# Patient Record
Sex: FEMALE | Race: ASIAN | NOT HISPANIC OR LATINO | Employment: UNEMPLOYED | URBAN - METROPOLITAN AREA
[De-identification: names, ages, dates, MRNs, and addresses within clinical notes are randomized per-mention and may not be internally consistent; named-entity substitution may affect disease eponyms.]

---

## 2022-08-19 ENCOUNTER — OFFICE VISIT (OUTPATIENT)
Dept: URGENT CARE | Facility: CLINIC | Age: 37
End: 2022-08-19

## 2022-08-19 VITALS
HEIGHT: 63 IN | SYSTOLIC BLOOD PRESSURE: 121 MMHG | OXYGEN SATURATION: 99 % | WEIGHT: 174 LBS | BODY MASS INDEX: 30.83 KG/M2 | RESPIRATION RATE: 16 BRPM | TEMPERATURE: 98.6 F | DIASTOLIC BLOOD PRESSURE: 78 MMHG | HEART RATE: 79 BPM

## 2022-08-19 DIAGNOSIS — H10.32 ACUTE BACTERIAL CONJUNCTIVITIS OF LEFT EYE: Primary | ICD-10-CM

## 2022-08-19 PROCEDURE — 99213 OFFICE O/P EST LOW 20 MIN: CPT

## 2022-08-19 RX ORDER — LEVOTHYROXINE SODIUM 0.03 MG/1
25 TABLET ORAL DAILY
COMMUNITY

## 2022-08-19 RX ORDER — POLYMYXIN B SULFATE AND TRIMETHOPRIM 1; 10000 MG/ML; [USP'U]/ML
1 SOLUTION OPHTHALMIC EVERY 4 HOURS
Qty: 10 ML | Refills: 0 | Status: SHIPPED | OUTPATIENT
Start: 2022-08-19 | End: 2022-08-26

## 2022-08-19 RX ORDER — FEXOFENADINE HCL 180 MG/1
180 TABLET ORAL DAILY
COMMUNITY

## 2022-08-19 NOTE — PROGRESS NOTES
[address] [employer] [facility]        NAME: Colette Salazar is a 39 y o  female  : 1985    MRN: [id number]  DATE: 2022  TIME: 6:08 PM    Assessment and Plan   Acute bacterial conjunctivitis of left eye [H10 32]  1  Acute bacterial conjunctivitis of left eye  polymyxin b-trimethoprim (POLYTRIM) ophthalmic solution         Patient Instructions     Start ophthalmic drops as directed  Follow up with PCP in 2-3 days  Proceed to ER if symptoms worsen  Chief Complaint     Chief Complaint   Patient presents with    Conjunctivitis     Left eye irritation and redness started this morning  History of Present Illness     39 y o  F presents with complaint of L eye redness, drainage and crusting x 1 day  States she woke up with her eye crusted shut  Denies pain or itching  Denies trauma or injury  No contact lenses  3year old daughter at home  Denies URI symptoms  No fevers  Denies visual disturbance  Review of Systems   Review of Systems   Constitutional: Negative for chills, fatigue and fever  HENT: Negative for congestion, ear pain, facial swelling, hearing loss, rhinorrhea, sinus pressure, sneezing, sore throat and trouble swallowing  Eyes: Positive for discharge and redness  Negative for photophobia, pain, itching and visual disturbance  Respiratory: Negative for cough, chest tightness, shortness of breath and wheezing  Cardiovascular: Negative for chest pain and palpitations  Gastrointestinal: Negative for abdominal pain, diarrhea, nausea and vomiting  Genitourinary: Negative for dysuria, flank pain, hematuria and pelvic pain  Musculoskeletal: Negative for arthralgias, back pain and myalgias  Skin: Negative for color change and rash  Neurological: Negative for dizziness, seizures, syncope, weakness, light-headedness, numbness and headaches  Psychiatric/Behavioral: Negative for confusion, hallucinations and sleep disturbance  The patient is not nervous/anxious  All other systems reviewed and are negative  Current Medications       Current Outpatient Medications:     polymyxin b-trimethoprim (POLYTRIM) ophthalmic solution, Administer 1 drop into the left eye every 4 (four) hours for 7 days, Disp: 10 mL, Rfl: 0    fexofenadine (ALLEGRA) 180 MG tablet, Take 180 mg by mouth daily, Disp: , Rfl:     levothyroxine 25 mcg tablet, Take 25 mcg by mouth daily, Disp: , Rfl:     Current Allergies     Allergies as of 08/19/2022 - Reviewed 08/19/2022   Allergen Reaction Noted    Pollen extract Itching 07/17/2021            The following portions of the patient's history were reviewed and updated as appropriate: allergies, current medications, past family history, past medical history, past social history, past surgical history and problem list      Past Medical History:   Diagnosis Date    Disease of thyroid gland        History reviewed  No pertinent surgical history  History reviewed  No pertinent family history  Medications have been verified  Objective   /78   Pulse 79   Temp 98 6 °F (37 °C)   Resp 16   Ht 5' 3" (1 6 m)   Wt 78 9 kg (174 lb)   SpO2 99%   BMI 30 82 kg/m²   No LMP recorded  Physical Exam     Physical Exam  Vitals reviewed  Constitutional:       General: She is not in acute distress  Appearance: Normal appearance  She is not toxic-appearing  HENT:      Head: Normocephalic  Right Ear: Tympanic membrane normal  Tympanic membrane is not erythematous or bulging  Left Ear: Tympanic membrane normal  Tympanic membrane is not erythematous or bulging  Nose: No congestion or rhinorrhea  Right Sinus: No maxillary sinus tenderness or frontal sinus tenderness  Left Sinus: No maxillary sinus tenderness or frontal sinus tenderness  Mouth/Throat:      Pharynx: Uvula midline  No oropharyngeal exudate, posterior oropharyngeal erythema or uvula swelling        Tonsils: No tonsillar exudate or tonsillar abscesses  Eyes:      General: No visual field deficit  Left eye: Discharge present  Extraocular Movements: Extraocular movements intact  Conjunctiva/sclera:      Left eye: Left conjunctiva is injected  Pupils: Pupils are equal, round, and reactive to light  Cardiovascular:      Rate and Rhythm: Normal rate and regular rhythm  Pulses: Normal pulses  Heart sounds: Normal heart sounds  Pulmonary:      Effort: Pulmonary effort is normal  No tachypnea or respiratory distress  Breath sounds: Normal breath sounds and air entry  No decreased breath sounds, wheezing, rhonchi or rales  Abdominal:      General: Bowel sounds are normal       Palpations: Abdomen is soft  Tenderness: There is no abdominal tenderness  There is no right CVA tenderness or guarding  Musculoskeletal:         General: Normal range of motion  Cervical back: Normal range of motion  Lymphadenopathy:      Cervical: No cervical adenopathy  Skin:     General: Skin is warm and dry  Neurological:      General: No focal deficit present  Mental Status: She is alert  Cranial Nerves: Cranial nerves are intact  Sensory: Sensation is intact  Motor: Motor function is intact  Coordination: Coordination is intact  Gait: Gait is intact  Deep Tendon Reflexes: Reflexes are normal and symmetric

## 2023-06-01 ENCOUNTER — OFFICE VISIT (OUTPATIENT)
Dept: URGENT CARE | Facility: CLINIC | Age: 38
End: 2023-06-01

## 2023-06-01 VITALS
BODY MASS INDEX: 29.05 KG/M2 | TEMPERATURE: 97.3 F | WEIGHT: 164 LBS | HEART RATE: 100 BPM | RESPIRATION RATE: 14 BRPM | OXYGEN SATURATION: 97 %

## 2023-06-01 DIAGNOSIS — J02.9 SORE THROAT: Primary | ICD-10-CM

## 2023-06-01 LAB — S PYO AG THROAT QL: NEGATIVE

## 2023-06-01 PROCEDURE — 87070 CULTURE OTHR SPECIMN AEROBIC: CPT | Performed by: PHYSICIAN ASSISTANT

## 2023-06-01 NOTE — PROGRESS NOTES
[facility]        NAME: Scotty Galloway is a 44 y o  female  : 1985    MRN: [id number]  DATE: 2023  TIME: 11:00 AM    Assessment and Plan   Sore throat [J02 9]  1  Sore throat  POCT rapid strepA    Throat culture            Patient Instructions   Patient Instructions   - strep           Follow up with PCP in 3-5 days  Proceed to  ER if symptoms worsen  Chief Complaint     Chief Complaint   Patient presents with   • Cold Like Symptoms     Pt presents with chest congestion, cough, stuffy nose, started last Friday         History of Present Illness       The patient is a 50-year-old female presenting today with her   She is concerned she may have strep throat as her  tested positive in the office for strep  She does report a cough, congestion and sore throat that began 1 week ago  She is having improvement in symptoms but would like a strep test just in case  Review of Systems   Review of Systems   Constitutional: Negative for activity change, appetite change, chills, fatigue and fever  HENT: Positive for congestion and sore throat  Negative for ear pain, rhinorrhea, sinus pressure and sinus pain  Eyes: Negative for pain and visual disturbance  Respiratory: Positive for cough  Negative for chest tightness and shortness of breath  Cardiovascular: Negative for chest pain and palpitations  Gastrointestinal: Negative for abdominal pain, diarrhea, nausea and vomiting  Genitourinary: Negative for dysuria and hematuria  Musculoskeletal: Negative for arthralgias, back pain and myalgias  Skin: Negative for color change, pallor and rash  Neurological: Negative for seizures, syncope and headaches  All other systems reviewed and are negative          Current Medications       Current Outpatient Medications:   •  fexofenadine (ALLEGRA) 180 MG tablet, Take 180 mg by mouth daily, Disp: , Rfl:   •  levothyroxine 25 mcg tablet, Take 25 mcg by mouth daily (Patient not taking: Reported on 6/1/2023), Disp: , Rfl:     Current Allergies     Allergies as of 06/01/2023 - Reviewed 06/01/2023   Allergen Reaction Noted   • Pollen extract Itching 07/17/2021            The following portions of the patient's history were reviewed and updated as appropriate: allergies, current medications, past family history, past medical history, past social history, past surgical history and problem list      Past Medical History:   Diagnosis Date   • Disease of thyroid gland        History reviewed  No pertinent surgical history  History reviewed  No pertinent family history  Medications have been verified  Objective   Pulse 100   Temp (!) 97 3 °F (36 3 °C)   Resp 14   Wt 74 4 kg (164 lb)   LMP 05/02/2023 (Approximate)   SpO2 97%   BMI 29 05 kg/m²        Physical Exam     Physical Exam  Vitals and nursing note reviewed  Constitutional:       General: She is not in acute distress  Appearance: Normal appearance  She is normal weight  She is not ill-appearing, toxic-appearing or diaphoretic  HENT:      Head: Normocephalic and atraumatic  Mouth/Throat:      Mouth: Mucous membranes are moist       Pharynx: Oropharynx is clear  Posterior oropharyngeal erythema (very slight ) present  No oropharyngeal exudate  Cardiovascular:      Rate and Rhythm: Normal rate and regular rhythm  Heart sounds: Normal heart sounds  No murmur heard  No friction rub  No gallop  Pulmonary:      Effort: Pulmonary effort is normal  No respiratory distress  Breath sounds: Normal breath sounds  No stridor  No wheezing, rhonchi or rales  Chest:      Chest wall: No tenderness  Skin:     General: Skin is warm and dry  Capillary Refill: Capillary refill takes less than 2 seconds  Neurological:      Mental Status: She is alert

## 2023-06-03 LAB — BACTERIA THROAT CULT: NORMAL

## 2023-10-09 ENCOUNTER — OFFICE VISIT (OUTPATIENT)
Dept: URGENT CARE | Facility: CLINIC | Age: 38
End: 2023-10-09
Payer: COMMERCIAL

## 2023-10-09 VITALS
SYSTOLIC BLOOD PRESSURE: 137 MMHG | WEIGHT: 165 LBS | RESPIRATION RATE: 15 BRPM | DIASTOLIC BLOOD PRESSURE: 73 MMHG | TEMPERATURE: 97.5 F | BODY MASS INDEX: 29.23 KG/M2 | HEART RATE: 93 BPM | OXYGEN SATURATION: 97 %

## 2023-10-09 DIAGNOSIS — H66.001 NON-RECURRENT ACUTE SUPPURATIVE OTITIS MEDIA OF RIGHT EAR WITHOUT SPONTANEOUS RUPTURE OF TYMPANIC MEMBRANE: Primary | ICD-10-CM

## 2023-10-09 PROCEDURE — G0382 LEV 3 HOSP TYPE B ED VISIT: HCPCS | Performed by: NURSE PRACTITIONER

## 2023-10-09 RX ORDER — AZITHROMYCIN 250 MG/1
TABLET, FILM COATED ORAL
Qty: 6 TABLET | Refills: 0 | Status: SHIPPED | OUTPATIENT
Start: 2023-10-09 | End: 2023-10-13

## 2023-10-09 NOTE — PROGRESS NOTES
[facility]        NAME: Michael Rai is a 44 y.o. female  : 1985    MRN: [id number]  DATE: 2023  TIME: 2:36 PM    Assessment and Plan   Non-recurrent acute suppurative otitis media of right ear without spontaneous rupture of tympanic membrane [H66.001]  1. Non-recurrent acute suppurative otitis media of right ear without spontaneous rupture of tympanic membrane  azithromycin (ZITHROMAX) 250 mg tablet            Patient Instructions       Follow up with PCP in 3-5 days. Proceed to  ER if symptoms worsen. Chief Complaint     Chief Complaint   Patient presents with   • Cold Like Symptoms     Pt reports low grade fever/ear pain /throat pain and she lost her sense of smell. History of Present Illness       Patient is a 44year old female presenting with 5 days of right ear pain, sore throat, congestion, and cough. She lost her sense of smell but it returned today. She has been having low grade temps of 100. She is taking ibuprofen with some relief. Review of Systems   Review of Systems   Constitutional: Positive for fever. Negative for activity change and chills. HENT: Positive for congestion, ear pain, postnasal drip, rhinorrhea and sore throat. Negative for ear discharge, sinus pressure and sinus pain. Respiratory: Positive for cough. Negative for shortness of breath. Neurological: Negative for headaches.          Current Medications       Current Outpatient Medications:   •  azithromycin (ZITHROMAX) 250 mg tablet, Take 2 tablets today then 1 tablet daily x 4 days, Disp: 6 tablet, Rfl: 0  •  fexofenadine (ALLEGRA) 180 MG tablet, Take 180 mg by mouth daily, Disp: , Rfl:   •  levothyroxine 25 mcg tablet, Take 25 mcg by mouth daily, Disp: , Rfl:     Current Allergies     Allergies as of 10/09/2023 - Reviewed 10/09/2023   Allergen Reaction Noted   • Pollen extract Itching 2021            The following portions of the patient's history were reviewed and updated as appropriate: allergies, current medications, past family history, past medical history, past social history, past surgical history and problem list.     Past Medical History:   Diagnosis Date   • Disease of thyroid gland        No past surgical history on file. No family history on file. Medications have been verified. Objective   /73   Pulse 93   Temp 97.5 °F (36.4 °C)   Resp 15   Wt 74.8 kg (165 lb)   SpO2 97%   BMI 29.23 kg/m²        Physical Exam     Physical Exam  Vitals reviewed. Constitutional:       General: She is awake. She is not in acute distress. Appearance: Normal appearance. She is normal weight. HENT:      Head: Normocephalic. Right Ear: Ear canal and external ear normal. Tympanic membrane is erythematous and bulging. Left Ear: Hearing, tympanic membrane, ear canal and external ear normal.      Nose: Nose normal.      Mouth/Throat:      Lips: Pink. Pharynx: Oropharynx is clear. Cardiovascular:      Rate and Rhythm: Normal rate and regular rhythm. Heart sounds: Normal heart sounds, S1 normal and S2 normal.   Pulmonary:      Effort: Pulmonary effort is normal.      Breath sounds: Normal breath sounds. No decreased breath sounds, wheezing or rhonchi. Skin:     General: Skin is warm and moist.   Neurological:      Mental Status: She is alert. Psychiatric:         Behavior: Behavior is cooperative.

## 2024-04-02 ENCOUNTER — OFFICE VISIT (OUTPATIENT)
Dept: URGENT CARE | Facility: CLINIC | Age: 39
End: 2024-04-02
Payer: COMMERCIAL

## 2024-04-02 VITALS
HEIGHT: 64 IN | OXYGEN SATURATION: 100 % | BODY MASS INDEX: 29.53 KG/M2 | HEART RATE: 74 BPM | WEIGHT: 173 LBS | TEMPERATURE: 98 F | DIASTOLIC BLOOD PRESSURE: 78 MMHG | SYSTOLIC BLOOD PRESSURE: 122 MMHG | RESPIRATION RATE: 20 BRPM

## 2024-04-02 DIAGNOSIS — H10.31 ACUTE BACTERIAL CONJUNCTIVITIS OF RIGHT EYE: Primary | ICD-10-CM

## 2024-04-02 PROBLEM — Z34.90 TERM PREGNANCY: Status: ACTIVE | Noted: 2021-07-17

## 2024-04-02 PROCEDURE — 99213 OFFICE O/P EST LOW 20 MIN: CPT

## 2024-04-02 RX ORDER — OFLOXACIN 3 MG/ML
2 SOLUTION/ DROPS OPHTHALMIC 4 TIMES DAILY
Qty: 5 ML | Refills: 0 | Status: SHIPPED | OUTPATIENT
Start: 2024-04-02

## 2024-04-02 NOTE — PROGRESS NOTES
[facility]        NAME: Margie Aguiar is a 38 y.o. female  : 1985    MRN: [id number]  DATE: 2024  TIME: 9:56 AM    Assessment and Plan   Acute bacterial conjunctivitis of right eye [H10.31]  1. Acute bacterial conjunctivitis of right eye  ofloxacin (OCUFLOX) 0.3 % ophthalmic solution        Physical exam consistent with conjunctivitis, eye drops as directed. VSS in clinic, appears in no acute distress. Educated on use of OTC products for additional relief of symptoms. Advised close follow-up with PCP or to report to the ER if symptoms worsen. Patient verbalizes understanding and agreeable to plan.       Patient Instructions     Apply drops to affected eye as directed for next 7 days. Apply warm compresses as using drops for additional relief. Wash hands frequently and avoid touching eyes. Change bed linen after 24 hours of using drops. Use oral (zyrtec, claritin, benadryl) or nasal (flonase) decongestants for additional relief of symptoms. Follow-up with PCP in 3-5 days if no improvement of symptoms. Report to ER if symptoms worsen.      Chief Complaint     Chief Complaint   Patient presents with    Conjunctivitis     Rt eye conjunctivitis         History of Present Illness       38 year old female presents for evaluation of right eye redness she woke up this morning with. She relates her  had pink eye last week and she does have small children at home. She also reports a h/o seasonal allergies. She reports eye itchiness, mild light sensitivity, and yellow crusty discharge. She also reports sore throat and congestion that started this morning. She denies visual disturbances, headaches, or lightheadedness. he does not wear glasses or contact lenses. She has not tried any interventions for symptoms.     Conjunctivitis   The current episode started today. The onset was sudden. The problem occurs continuously. The problem has been unchanged. The problem is mild. Nothing  relieves the symptoms. Nothing aggravates the symptoms. Associated symptoms include eye itching, congestion, rhinorrhea, sore throat, eye discharge, eye pain and eye redness. Pertinent negatives include no orthopnea, no fever, no decreased vision, no double vision, no photophobia, no abdominal pain, no constipation, no diarrhea, no nausea, no vomiting, no ear discharge, no ear pain, no headaches, no hearing loss, no mouth sores, no stridor, no swollen glands, no muscle aches, no neck pain, no neck stiffness, no cough, no URI, no wheezing and no rash. The eye pain is mild. The right eye is affected. The eye pain is not associated with movement. The eyelid exhibits redness. She has been Behaving normally. She has been Eating and drinking normally. Urine output has been normal. The last void occurred Less than 6 hours ago. There were no sick contacts. She has received no recent medical care.       Review of Systems   Review of Systems   Constitutional:  Negative for activity change, appetite change, chills, fatigue and fever.   HENT:  Positive for congestion, rhinorrhea and sore throat. Negative for ear discharge, ear pain, hearing loss, mouth sores, postnasal drip, sinus pressure, sinus pain, sneezing and trouble swallowing.    Eyes:  Positive for pain, discharge, redness and itching. Negative for double vision, photophobia and visual disturbance.   Respiratory:  Negative for cough, chest tightness, shortness of breath, wheezing and stridor.    Cardiovascular:  Negative for chest pain, palpitations and orthopnea.   Gastrointestinal:  Negative for abdominal pain, constipation, diarrhea, nausea and vomiting.   Musculoskeletal:  Negative for arthralgias, back pain, myalgias and neck pain.   Skin:  Negative for color change, pallor and rash.   Allergic/Immunologic: Positive for environmental allergies. Negative for food allergies.   Neurological:  Negative for dizziness, light-headedness and headaches.         Current  "Medications       Current Outpatient Medications:     levothyroxine 25 mcg tablet, Take 25 mcg by mouth daily, Disp: , Rfl:     ofloxacin (OCUFLOX) 0.3 % ophthalmic solution, Administer 2 drops to the right eye 4 (four) times a day, Disp: 5 mL, Rfl: 0    fexofenadine (ALLEGRA) 180 MG tablet, Take 180 mg by mouth daily (Patient not taking: Reported on 4/2/2024), Disp: , Rfl:     Current Allergies     Allergies as of 04/02/2024 - Reviewed 04/02/2024   Allergen Reaction Noted    Pollen extract Itching 07/17/2021            The following portions of the patient's history were reviewed and updated as appropriate: allergies, current medications, past family history, past medical history, past social history, past surgical history and problem list.     Past Medical History:   Diagnosis Date    Disease of thyroid gland        History reviewed. No pertinent surgical history.    History reviewed. No pertinent family history.      Medications have been verified.        Objective   /78   Pulse 74   Temp 98 °F (36.7 °C)   Resp 20   Ht 5' 4\" (1.626 m)   Wt 78.5 kg (173 lb)   LMP 03/18/2024 (Approximate)   SpO2 100%   BMI 29.70 kg/m²        Physical Exam     Physical Exam  Vitals and nursing note reviewed.   Constitutional:       General: She is awake. She is not in acute distress.     Appearance: Normal appearance. She is well-developed and normal weight.   HENT:      Head: Normocephalic and atraumatic.      Right Ear: Hearing, tympanic membrane, ear canal and external ear normal.      Left Ear: Hearing, tympanic membrane, ear canal and external ear normal.      Nose: Congestion and rhinorrhea present. Rhinorrhea is clear.      Right Turbinates: Not enlarged, swollen or pale.      Left Turbinates: Not enlarged, swollen or pale.      Right Sinus: No maxillary sinus tenderness or frontal sinus tenderness.      Left Sinus: No maxillary sinus tenderness or frontal sinus tenderness.      Mouth/Throat:      Lips: Pink.    "   Mouth: Mucous membranes are moist.      Pharynx: Oropharynx is clear. Uvula midline. No oropharyngeal exudate or posterior oropharyngeal erythema.      Tonsils: No tonsillar exudate or tonsillar abscesses. 2+ on the right. 2+ on the left.   Eyes:      General:         Right eye: Discharge present. No foreign body or hordeolum.         Left eye: No foreign body, discharge or hordeolum.      Extraocular Movements: Extraocular movements intact.      Conjunctiva/sclera:      Right eye: Right conjunctiva is injected. No chemosis, exudate or hemorrhage.     Left eye: Left conjunctiva is not injected. No chemosis, exudate or hemorrhage.     Pupils: Pupils are equal, round, and reactive to light.      Comments: Dried yellow/crusty discharge present around right eye   Cardiovascular:      Rate and Rhythm: Normal rate and regular rhythm.      Pulses: Normal pulses.      Heart sounds: Normal heart sounds.   Pulmonary:      Effort: Pulmonary effort is normal.      Breath sounds: Normal breath sounds.   Musculoskeletal:      Cervical back: Full passive range of motion without pain, normal range of motion and neck supple.   Lymphadenopathy:      Cervical: No cervical adenopathy.   Skin:     General: Skin is warm and dry.   Neurological:      General: No focal deficit present.      Mental Status: She is alert and oriented to person, place, and time.   Psychiatric:         Mood and Affect: Mood normal.         Behavior: Behavior normal. Behavior is cooperative.         Thought Content: Thought content normal.         Judgment: Judgment normal.

## 2024-04-02 NOTE — PATIENT INSTRUCTIONS
Apply drops to affected eye as directed for next 7 days. Apply warm compresses as using drops for additional relief. Wash hands frequently and avoid touching eyes. Change bed linen after 24 hours of using drops. Use oral (zyrtec, claritin, benadryl) or nasal (flonase) decongestants for additional relief of symptoms. Follow-up with PCP in 3-5 days if no improvement of symptoms. Report to ER if symptoms worsen.

## 2024-09-20 ENCOUNTER — OFFICE VISIT (OUTPATIENT)
Dept: OBGYN CLINIC | Facility: CLINIC | Age: 39
End: 2024-09-20
Payer: COMMERCIAL

## 2024-09-20 VITALS
HEIGHT: 64 IN | WEIGHT: 175.8 LBS | BODY MASS INDEX: 30.01 KG/M2 | SYSTOLIC BLOOD PRESSURE: 110 MMHG | DIASTOLIC BLOOD PRESSURE: 70 MMHG

## 2024-09-20 DIAGNOSIS — Z01.411 ENCOUNTER FOR GYNECOLOGICAL EXAMINATION WITH ABNORMAL FINDING: Primary | ICD-10-CM

## 2024-09-20 DIAGNOSIS — N92.6 IRREGULAR MENSES: ICD-10-CM

## 2024-09-20 DIAGNOSIS — Z12.31 ENCOUNTER FOR SCREENING MAMMOGRAM FOR BREAST CANCER: ICD-10-CM

## 2024-09-20 DIAGNOSIS — N39.3 STRESS INCONTINENCE: ICD-10-CM

## 2024-09-20 PROBLEM — Z34.90 TERM PREGNANCY: Status: RESOLVED | Noted: 2021-07-17 | Resolved: 2024-09-20

## 2024-09-20 PROCEDURE — G0476 HPV COMBO ASSAY CA SCREEN: HCPCS | Performed by: PHYSICIAN ASSISTANT

## 2024-09-20 PROCEDURE — 99385 PREV VISIT NEW AGE 18-39: CPT | Performed by: PHYSICIAN ASSISTANT

## 2024-09-20 PROCEDURE — G0145 SCR C/V CYTO,THINLAYER,RESCR: HCPCS | Performed by: PHYSICIAN ASSISTANT

## 2024-09-20 RX ORDER — CETIRIZINE HYDROCHLORIDE 10 MG/1
10 TABLET ORAL AS NEEDED
COMMUNITY

## 2024-09-20 NOTE — PATIENT INSTRUCTIONS
Mammogram due October 2024.    Call PT for appointment.    Go for pelvic ultrasound.    Call if periods worsen or change.    Follow up with AMARILIS if second pregnancy desired.

## 2024-09-20 NOTE — PROGRESS NOTES
Assessment/Plan:      Diagnoses and all orders for this visit:    Encounter for gynecological examination with abnormal finding  -     Liquid-based pap, screening    Irregular menses  -     US pelvis complete w transvaginal; Future    Stress incontinence  -     Ambulatory Referral to Physical Therapy; Future    Encounter for screening mammogram for breast cancer  -     Mammo screening bilateral w 3d and cad; Future    Other orders  -     cetirizine (ZyrTEC) 10 mg tablet; Take 10 mg by mouth if needed for allergies        Pap done.  Order for mammogram entered.  Referral to pelvic floor PT entered; patient to call for appointment.  Order for pelvic U/S entered secondary to irregular menses; we will call with results.  Call if periods worsen or change.  F/u with AMARILIS if she chooses to try for a second pregnancy.  If no problems, patient to return in 1 year for routine gyn care.    Subjective:     Patient ID: Margie Aguiar is a 38 y.o. female.    Patient is here for yearly gyn exam.  She is new to our office today.  It has been 3 years since her last gyn visit.  States she is doing well overall.  Patient is a  via IVF pregnancy with vaginal delivery 3 years ago.  Currently has 1 embryo remaining.  Still considering if she wishes to try for further pregnancy.  Periods are irregular every 45 days or so, and bleeding lasts for 4-5 days.  She denies heavy bleeding and severe cramping.  Will occasionally skip a cycle.  Never officially diagnosed with PCOS, but her sister has it.  Patient is sexually active with her .  Complains of worsening stress incontinence, as well as painless vaginal lump.  Denies bowel/bladder changes, pelvic pain, bloating, abdominal pain, n/v, and change in appetite.  Had thyroid dysfunction during her pregnancy but has not followed up with PCP.  Has appointment scheduled for 10/7. No history of abnormal Paps.    Will be due for first mammogram in October.  She is performing  "self-breast exam.  Denies new masses, skin changes, nipple discharge, and pain/tenderness.  Unclear family history of cancer in a maternal aunt; otherwise negative.        Review of Systems   Constitutional:  Negative for appetite change and unexpected weight change.   Cardiovascular:         No masses, skin changes, nipple discharge, and pain/tenderness.   Gastrointestinal:  Negative for abdominal distention, abdominal pain, constipation, diarrhea, nausea and vomiting.   Genitourinary:  Positive for menstrual problem (Irregular menses). Negative for difficulty urinating, dysuria, frequency, genital sores, hematuria, pelvic pain, urgency, vaginal bleeding, vaginal discharge and vaginal pain.         Objective:  Visit Vitals  /70 (BP Location: Left arm, Patient Position: Sitting, Cuff Size: Adult)   Ht 5' 4\" (1.626 m)   Wt 79.7 kg (175 lb 12.8 oz)   LMP 09/15/2024 (Exact Date)   BMI 30.18 kg/m²   OB Status Unknown   Smoking Status Never   BSA 1.85 m²         Physical Exam  Vitals reviewed. Exam conducted with a chaperone present.   Constitutional:       Appearance: Normal appearance. She is well-developed.   Neck:      Thyroid: No thyromegaly.   Pulmonary:      Effort: Pulmonary effort is normal.   Chest:   Breasts:     Breasts are symmetrical.      Right: Normal. No swelling, bleeding, inverted nipple, mass, nipple discharge, skin change or tenderness.      Left: Normal. No swelling, bleeding, inverted nipple, mass, nipple discharge, skin change or tenderness.   Abdominal:      General: There is no distension.      Palpations: Abdomen is soft.      Tenderness: There is no abdominal tenderness.   Genitourinary:     General: Normal vulva.      Pubic Area: No rash.       Labia:         Right: No rash, tenderness, lesion or injury.         Left: No rash, tenderness, lesion or injury.       Vagina: Prolapsed vaginal walls present. No vaginal discharge, erythema, tenderness or bleeding.      Cervix: Normal.      " Uterus: Normal.       Adnexa: Right adnexa normal and left adnexa normal.        Right: No mass, tenderness or fullness.          Left: No mass, tenderness or fullness.        Comments: Mild anterior vaginal wall prolapse.  Musculoskeletal:      Cervical back: Neck supple.   Lymphadenopathy:      Cervical: No cervical adenopathy.      Upper Body:      Right upper body: No supraclavicular or axillary adenopathy.      Left upper body: No supraclavicular or axillary adenopathy.      Lower Body: No right inguinal adenopathy. No left inguinal adenopathy.   Skin:     General: Skin is warm and dry.   Neurological:      Mental Status: She is alert and oriented to person, place, and time.   Psychiatric:         Behavior: Behavior normal. Behavior is cooperative.         Thought Content: Thought content normal.         Judgment: Judgment normal.

## 2024-09-24 LAB
HPV HR 12 DNA CVX QL NAA+PROBE: NEGATIVE
HPV16 DNA CVX QL NAA+PROBE: NEGATIVE
HPV18 DNA CVX QL NAA+PROBE: NEGATIVE

## 2024-09-25 LAB
LAB AP GYN PRIMARY INTERPRETATION: NORMAL
Lab: NORMAL

## 2024-10-14 ENCOUNTER — OFFICE VISIT (OUTPATIENT)
Dept: FAMILY MEDICINE CLINIC | Facility: CLINIC | Age: 39
End: 2024-10-14
Payer: COMMERCIAL

## 2024-10-14 VITALS
HEIGHT: 63 IN | WEIGHT: 175 LBS | SYSTOLIC BLOOD PRESSURE: 118 MMHG | BODY MASS INDEX: 31.01 KG/M2 | TEMPERATURE: 97.7 F | HEART RATE: 95 BPM | RESPIRATION RATE: 16 BRPM | DIASTOLIC BLOOD PRESSURE: 76 MMHG | OXYGEN SATURATION: 95 %

## 2024-10-14 DIAGNOSIS — Z13.228 SCREENING FOR METABOLIC DISORDER: ICD-10-CM

## 2024-10-14 DIAGNOSIS — Z76.89 ENCOUNTER TO ESTABLISH CARE: ICD-10-CM

## 2024-10-14 DIAGNOSIS — J30.2 SEASONAL ALLERGIES: ICD-10-CM

## 2024-10-14 DIAGNOSIS — Z11.4 ENCOUNTER FOR SCREENING FOR HIV: ICD-10-CM

## 2024-10-14 DIAGNOSIS — Z13.0 SCREENING FOR IRON DEFICIENCY ANEMIA: ICD-10-CM

## 2024-10-14 DIAGNOSIS — Z86.39 HISTORY OF THYROID DISORDER: Primary | ICD-10-CM

## 2024-10-14 PROCEDURE — 99203 OFFICE O/P NEW LOW 30 MIN: CPT | Performed by: STUDENT IN AN ORGANIZED HEALTH CARE EDUCATION/TRAINING PROGRAM

## 2024-10-14 NOTE — PROGRESS NOTES
Ambulatory Visit  Name: Margie Aguiar      : 1985      MRN: [id number]  Encounter Provider: Jessica Tello MD  Encounter Date: 10/14/2024   Encounter department: [facility]    Assessment & Plan  History of thyroid disorder    Orders:    TSH, 3rd generation with Free T4 reflex; Future    BMI 30.0-30.9,adult    Orders:    Lipid Panel with Direct LDL reflex; Future    Comprehensive metabolic panel; Future    Screening for metabolic disorder    Orders:    Hemoglobin A1C; Future    Screening for iron deficiency anemia    Orders:    CBC and differential; Future    Encounter for screening for HIV    Orders:    HIV 1/2 AG/AB W REFLEX [facility] and [facility] only; Future    Seasonal allergies         Encounter to establish care            History of Present Illness     HPI    Patient presents to establish care. One child via IVF.   She does have irregular periods.   No smoking and drinking history  Father has diabetes  Mother has no health concerns.  Patient woke up with sore throat. Patient has seasonal allergies.   She had thyroid disorder while being pregnant but did not follow up with TSH      Review of Systems   Constitutional:  Negative for activity change, chills, diaphoresis, fatigue and fever.   HENT:  Positive for sore throat. Negative for congestion, postnasal drip and rhinorrhea.    Respiratory:  Negative for cough, shortness of breath and wheezing.    Cardiovascular:  Negative for chest pain, palpitations and leg swelling.   Gastrointestinal:  Negative for abdominal pain, constipation, diarrhea, nausea and vomiting.   Musculoskeletal:  Negative for myalgias.   Skin:  Negative for rash.   Neurological:  Negative for weakness, light-headedness and headaches.   Psychiatric/Behavioral:  The patient is not nervous/anxious.            Objective     /76 (BP Location: Left arm, Patient Position: Sitting, Cuff Size: Standard)   Pulse 95   Temp 97.7 °F (36.5 °C) (Temporal)    "Resp 16   Ht 5' 3.25\" (1.607 m)   Wt 79.4 kg (175 lb)   LMP 09/15/2024 (Exact Date)   SpO2 95%   BMI 30.76 kg/m²     Physical Exam  Constitutional:       Appearance: She is well-developed.   HENT:      Head: Normocephalic and atraumatic.      Right Ear: Tympanic membrane and ear canal normal.      Left Ear: Tympanic membrane and ear canal normal.      Mouth/Throat:      Mouth: Mucous membranes are moist.      Pharynx: Posterior oropharyngeal erythema present.   Cardiovascular:      Rate and Rhythm: Normal rate and regular rhythm.   Pulmonary:      Effort: Pulmonary effort is normal.      Breath sounds: Normal breath sounds.   Neurological:      General: No focal deficit present.      Mental Status: She is alert and oriented to person, place, and time.   Psychiatric:         Mood and Affect: Mood normal.         Behavior: Behavior normal.         "

## 2024-10-17 ENCOUNTER — HOSPITAL ENCOUNTER (OUTPATIENT)
Dept: ULTRASOUND IMAGING | Facility: HOSPITAL | Age: 39
End: 2024-10-17
Payer: COMMERCIAL

## 2024-10-17 DIAGNOSIS — N92.6 IRREGULAR MENSES: ICD-10-CM

## 2024-10-17 PROCEDURE — 76856 US EXAM PELVIC COMPLETE: CPT

## 2024-10-17 PROCEDURE — 76830 TRANSVAGINAL US NON-OB: CPT

## 2024-10-23 ENCOUNTER — TELEPHONE (OUTPATIENT)
Age: 39
End: 2024-10-23

## 2024-10-23 NOTE — TELEPHONE ENCOUNTER
US pelvis significant findings.     IMPRESSION:     1. Mild heterogeneous uterine echotexture may suggest adenomyosis. If more sensitive and specific evaluation for uterine adenomyosis is clinically warranted, contrast-enhanced pelvic MRI may be considered.     2. Bilocular left ovarian cyst measuring 1.9 cm, categorized as O-RADS 2. Recommend repeat follow-up ultrasound in 6 months.

## 2024-10-24 ENCOUNTER — TELEPHONE (OUTPATIENT)
Dept: OBGYN CLINIC | Facility: CLINIC | Age: 39
End: 2024-10-24

## 2024-10-24 DIAGNOSIS — N83.202 LEFT OVARIAN CYST: Primary | ICD-10-CM

## 2024-10-24 NOTE — TELEPHONE ENCOUNTER
Spoke with patient regarding pelvic U/S results.  Possible adenomyosis of uterus seen; endometrium WNL.  L bilocular ovarian cyst seen; f/u in 6 mos for resolution.  Order entered.  Will continue to monitor periods.  Call with problems.

## 2024-11-08 ENCOUNTER — APPOINTMENT (OUTPATIENT)
Dept: LAB | Facility: HOSPITAL | Age: 39
End: 2024-11-08
Payer: COMMERCIAL

## 2024-11-08 DIAGNOSIS — Z13.0 SCREENING FOR IRON DEFICIENCY ANEMIA: ICD-10-CM

## 2024-11-08 DIAGNOSIS — Z11.4 ENCOUNTER FOR SCREENING FOR HIV: ICD-10-CM

## 2024-11-08 DIAGNOSIS — Z13.228 SCREENING FOR METABOLIC DISORDER: ICD-10-CM

## 2024-11-08 DIAGNOSIS — Z86.39 HISTORY OF THYROID DISORDER: ICD-10-CM

## 2024-11-08 LAB
ALBUMIN SERPL BCG-MCNC: 4.5 G/DL (ref 3.5–5)
ALP SERPL-CCNC: 58 U/L (ref 34–104)
ALT SERPL W P-5'-P-CCNC: 14 U/L (ref 7–52)
ANION GAP SERPL CALCULATED.3IONS-SCNC: 7 MMOL/L (ref 4–13)
AST SERPL W P-5'-P-CCNC: 13 U/L (ref 13–39)
BASOPHILS # BLD AUTO: 0.03 THOUSANDS/ÂΜL (ref 0–0.1)
BASOPHILS NFR BLD AUTO: 0 % (ref 0–1)
BILIRUB SERPL-MCNC: 0.42 MG/DL (ref 0.2–1)
BUN SERPL-MCNC: 11 MG/DL (ref 5–25)
CALCIUM SERPL-MCNC: 9.5 MG/DL (ref 8.4–10.2)
CHLORIDE SERPL-SCNC: 102 MMOL/L (ref 96–108)
CHOLEST SERPL-MCNC: 244 MG/DL
CO2 SERPL-SCNC: 30 MMOL/L (ref 21–32)
CREAT SERPL-MCNC: 0.58 MG/DL (ref 0.6–1.3)
EOSINOPHIL # BLD AUTO: 0.23 THOUSAND/ÂΜL (ref 0–0.61)
EOSINOPHIL NFR BLD AUTO: 3 % (ref 0–6)
ERYTHROCYTE [DISTWIDTH] IN BLOOD BY AUTOMATED COUNT: 12.5 % (ref 11.6–15.1)
EST. AVERAGE GLUCOSE BLD GHB EST-MCNC: 117 MG/DL
GFR SERPL CREATININE-BSD FRML MDRD: 115 ML/MIN/1.73SQ M
GLUCOSE P FAST SERPL-MCNC: 94 MG/DL (ref 65–99)
HBA1C MFR BLD: 5.7 %
HCT VFR BLD AUTO: 40.6 % (ref 34.8–46.1)
HDLC SERPL-MCNC: 38 MG/DL
HGB BLD-MCNC: 12.8 G/DL (ref 11.5–15.4)
IMM GRANULOCYTES # BLD AUTO: 0.02 THOUSAND/UL (ref 0–0.2)
IMM GRANULOCYTES NFR BLD AUTO: 0 % (ref 0–2)
LDLC SERPL CALC-MCNC: 152 MG/DL (ref 0–100)
LYMPHOCYTES # BLD AUTO: 2.64 THOUSANDS/ÂΜL (ref 0.6–4.47)
LYMPHOCYTES NFR BLD AUTO: 30 % (ref 14–44)
MCH RBC QN AUTO: 28.1 PG (ref 26.8–34.3)
MCHC RBC AUTO-ENTMCNC: 31.5 G/DL (ref 31.4–37.4)
MCV RBC AUTO: 89 FL (ref 82–98)
MONOCYTES # BLD AUTO: 0.51 THOUSAND/ÂΜL (ref 0.17–1.22)
MONOCYTES NFR BLD AUTO: 6 % (ref 4–12)
NEUTROPHILS # BLD AUTO: 5.34 THOUSANDS/ÂΜL (ref 1.85–7.62)
NEUTS SEG NFR BLD AUTO: 61 % (ref 43–75)
NRBC BLD AUTO-RTO: 0 /100 WBCS
PLATELET # BLD AUTO: 281 THOUSANDS/UL (ref 149–390)
PMV BLD AUTO: 9.2 FL (ref 8.9–12.7)
POTASSIUM SERPL-SCNC: 4.6 MMOL/L (ref 3.5–5.3)
PROT SERPL-MCNC: 7.9 G/DL (ref 6.4–8.4)
RBC # BLD AUTO: 4.55 MILLION/UL (ref 3.81–5.12)
SODIUM SERPL-SCNC: 139 MMOL/L (ref 135–147)
TRIGL SERPL-MCNC: 270 MG/DL
TSH SERPL DL<=0.05 MIU/L-ACNC: 2.57 UIU/ML (ref 0.45–4.5)
WBC # BLD AUTO: 8.77 THOUSAND/UL (ref 4.31–10.16)

## 2024-11-08 PROCEDURE — 80053 COMPREHEN METABOLIC PANEL: CPT

## 2024-11-08 PROCEDURE — 83036 HEMOGLOBIN GLYCOSYLATED A1C: CPT

## 2024-11-08 PROCEDURE — 36415 COLL VENOUS BLD VENIPUNCTURE: CPT

## 2024-11-08 PROCEDURE — 85025 COMPLETE CBC W/AUTO DIFF WBC: CPT

## 2024-11-08 PROCEDURE — 87389 HIV-1 AG W/HIV-1&-2 AB AG IA: CPT

## 2024-11-08 PROCEDURE — 80061 LIPID PANEL: CPT

## 2024-11-08 PROCEDURE — 84443 ASSAY THYROID STIM HORMONE: CPT

## 2024-11-09 LAB — HIV 1+2 AB+HIV1 P24 AG SERPL QL IA: NON REACTIVE

## 2024-11-11 ENCOUNTER — OFFICE VISIT (OUTPATIENT)
Dept: FAMILY MEDICINE CLINIC | Facility: CLINIC | Age: 39
End: 2024-11-11
Payer: COMMERCIAL

## 2024-11-11 VITALS
OXYGEN SATURATION: 98 % | WEIGHT: 171 LBS | HEART RATE: 94 BPM | SYSTOLIC BLOOD PRESSURE: 100 MMHG | TEMPERATURE: 97.1 F | RESPIRATION RATE: 16 BRPM | DIASTOLIC BLOOD PRESSURE: 80 MMHG | BODY MASS INDEX: 30.3 KG/M2 | HEIGHT: 63 IN

## 2024-11-11 DIAGNOSIS — E78.2 MIXED HYPERLIPIDEMIA: ICD-10-CM

## 2024-11-11 DIAGNOSIS — Z00.00 ANNUAL PHYSICAL EXAM: Primary | ICD-10-CM

## 2024-11-11 DIAGNOSIS — Z23 ENCOUNTER FOR IMMUNIZATION: ICD-10-CM

## 2024-11-11 DIAGNOSIS — R73.03 PREDIABETES: ICD-10-CM

## 2024-11-11 DIAGNOSIS — H91.91 HEARING LOSS OF RIGHT EAR, UNSPECIFIED HEARING LOSS TYPE: ICD-10-CM

## 2024-11-11 PROBLEM — N83.209 OVARIAN CYST: Status: ACTIVE | Noted: 2024-11-11

## 2024-11-11 PROBLEM — N92.6 IRREGULAR PERIODS: Status: ACTIVE | Noted: 2024-11-11

## 2024-11-11 PROBLEM — N80.03 ADENOMYOSIS: Status: ACTIVE | Noted: 2024-11-11

## 2024-11-11 PROCEDURE — 99396 PREV VISIT EST AGE 40-64: CPT | Performed by: STUDENT IN AN ORGANIZED HEALTH CARE EDUCATION/TRAINING PROGRAM

## 2024-11-11 PROCEDURE — 99214 OFFICE O/P EST MOD 30 MIN: CPT | Performed by: STUDENT IN AN ORGANIZED HEALTH CARE EDUCATION/TRAINING PROGRAM

## 2024-11-11 PROCEDURE — 90471 IMMUNIZATION ADMIN: CPT | Performed by: STUDENT IN AN ORGANIZED HEALTH CARE EDUCATION/TRAINING PROGRAM

## 2024-11-11 PROCEDURE — 90656 IIV3 VACC NO PRSV 0.5 ML IM: CPT | Performed by: STUDENT IN AN ORGANIZED HEALTH CARE EDUCATION/TRAINING PROGRAM

## 2024-11-11 RX ORDER — ROSUVASTATIN CALCIUM 10 MG/1
10 TABLET, COATED ORAL DAILY
Qty: 90 TABLET | Refills: 1 | Status: SHIPPED | OUTPATIENT
Start: 2024-11-11

## 2024-11-11 NOTE — PROGRESS NOTES
Adult Annual Physical  Name: Margie Aguiar      : 1985      MRN: [id number]  Encounter Provider: Jessica Tello MD  Encounter Date: 2024   Encounter department: [facility]    Assessment & Plan  Annual physical exam         Encounter for immunization    Orders:    influenza vaccine, recombinant, PF, 0.5 mL IM (Flublok)    Mixed hyperlipidemia    Orders:    rosuvastatin (CRESTOR) 10 MG tablet; Take 1 tablet (10 mg total) by mouth daily    Lipid Panel with Direct LDL reflex; Future    Hearing loss of right ear, unspecified hearing loss type    Orders:    Ambulatory Referral to Audiology; Future    Prediabetes  -A1c 5.7  -Diet and lifestyle modifications discussed  Orders:    Hemoglobin A1C; Future    Immunizations and preventive care screenings were discussed with patient today. Appropriate education was printed on patient's after visit summary.    Counseling:  Dental Health: discussed importance of regular tooth brushing, flossing, and dental visits.  Exercise: the importance of regular exercise/physical activity was discussed. Recommend exercise 3-5 times per week for at least 30 minutes.       Depression Screening and Follow-up Plan: Patient was screened for depression during today's encounter. They screened negative with a PHQ-2 score of 0.        History of Present Illness     Adult Annual Physical:  Patient presents for annual physical. Patient reports hearing issues in right ear. This has been an ongoing issue. She has not had an audiology exam. .     Diet and Physical Activity:  - Diet/Nutrition: well balanced diet, consuming 3-5 servings of fruits/vegetables daily and adequate fiber intake.  - Exercise: no formal exercise.    Depression Screening:  - PHQ-2 Score: 0    General Health:  - Sleep: sleeps well and 7-8 hours of sleep on average.  - Hearing: normal hearing right ear and decreased hearing right ear.  - Vision: no vision problems and most recent eye  "exam > 1 year ago.  - Dental: regular dental visits and brushes teeth once daily.    /GYN Health:  - Follows with GYN: yes.   - History of STDs: no    Review of Systems   Constitutional:  Negative for activity change, chills, diaphoresis, fatigue and fever.   HENT:  Negative for congestion, postnasal drip, rhinorrhea and sore throat.    Respiratory:  Negative for cough, shortness of breath and wheezing.    Cardiovascular:  Negative for chest pain, palpitations and leg swelling.   Gastrointestinal:  Negative for abdominal pain, constipation, diarrhea, nausea and vomiting.   Musculoskeletal:  Negative for myalgias.   Skin:  Negative for rash.   Neurological:  Negative for weakness, light-headedness and headaches.   Psychiatric/Behavioral:  The patient is not nervous/anxious.          Objective     /80 (BP Location: Left arm, Patient Position: Sitting, Cuff Size: Large)   Pulse 94   Temp (!) 97.1 °F (36.2 °C) (Temporal)   Resp 16   Ht 5' 2.5\" (1.588 m)   Wt 77.6 kg (171 lb)   LMP 09/16/2024 (Approximate)   SpO2 98%   BMI 30.78 kg/m²     Physical Exam  Vitals and nursing note reviewed.   Constitutional:       General: She is not in acute distress.     Appearance: She is well-developed.   HENT:      Head: Normocephalic and atraumatic.   Eyes:      Conjunctiva/sclera: Conjunctivae normal.   Cardiovascular:      Rate and Rhythm: Normal rate and regular rhythm.      Heart sounds: No murmur heard.  Pulmonary:      Effort: Pulmonary effort is normal. No respiratory distress.      Breath sounds: Normal breath sounds.   Abdominal:      Palpations: Abdomen is soft.      Tenderness: There is no abdominal tenderness.   Musculoskeletal:         General: No swelling.      Cervical back: Neck supple.   Skin:     General: Skin is warm and dry.      Capillary Refill: Capillary refill takes less than 2 seconds.   Neurological:      Mental Status: She is alert.   Psychiatric:         Mood and Affect: Mood normal.         "

## 2024-11-11 NOTE — ASSESSMENT & PLAN NOTE
Orders:    rosuvastatin (CRESTOR) 10 MG tablet; Take 1 tablet (10 mg total) by mouth daily    Lipid Panel with Direct LDL reflex; Future

## 2024-12-12 ENCOUNTER — HOSPITAL ENCOUNTER (OUTPATIENT)
Facility: HOSPITAL | Age: 39
End: 2024-12-12
Payer: COMMERCIAL

## 2024-12-12 VITALS — HEIGHT: 63 IN | BODY MASS INDEX: 30.3 KG/M2 | WEIGHT: 171 LBS

## 2024-12-12 DIAGNOSIS — Z12.31 ENCOUNTER FOR SCREENING MAMMOGRAM FOR BREAST CANCER: ICD-10-CM

## 2024-12-12 PROCEDURE — 77063 BREAST TOMOSYNTHESIS BI: CPT

## 2024-12-12 PROCEDURE — 77067 SCR MAMMO BI INCL CAD: CPT

## 2024-12-16 ENCOUNTER — RESULTS FOLLOW-UP (OUTPATIENT)
Dept: OBGYN CLINIC | Facility: CLINIC | Age: 39
End: 2024-12-16

## 2024-12-16 NOTE — TELEPHONE ENCOUNTER
Pt called back to return VM per staff provider is in with a pt was informed by staff to send an encounter to provider to call pt back when able too.

## 2024-12-16 NOTE — TELEPHONE ENCOUNTER
Spoke with patient regarding mammogram results - new mass in L breast.  Needs additional imaging; orders in Epic.  Patient to schedule.  Call with further questions.

## 2025-01-21 ENCOUNTER — TELEPHONE (OUTPATIENT)
Age: 40
End: 2025-01-21

## 2025-01-21 NOTE — TELEPHONE ENCOUNTER
Patient states she received a call from the obgyn office.  Reviewed chart no messages found.  Relayed to patient.

## 2025-02-28 ENCOUNTER — OFFICE VISIT (OUTPATIENT)
Dept: FAMILY MEDICINE CLINIC | Facility: CLINIC | Age: 40
End: 2025-02-28
Payer: COMMERCIAL

## 2025-02-28 ENCOUNTER — PATIENT MESSAGE (OUTPATIENT)
Dept: FAMILY MEDICINE CLINIC | Facility: CLINIC | Age: 40
End: 2025-02-28

## 2025-02-28 VITALS
SYSTOLIC BLOOD PRESSURE: 124 MMHG | WEIGHT: 158 LBS | DIASTOLIC BLOOD PRESSURE: 80 MMHG | RESPIRATION RATE: 16 BRPM | HEART RATE: 76 BPM | TEMPERATURE: 97.7 F | OXYGEN SATURATION: 98 % | BODY MASS INDEX: 28 KG/M2 | HEIGHT: 63 IN

## 2025-02-28 DIAGNOSIS — R73.03 PREDIABETES: Primary | ICD-10-CM

## 2025-02-28 DIAGNOSIS — R92.8 ABNORMAL MAMMOGRAM OF LEFT BREAST: ICD-10-CM

## 2025-02-28 DIAGNOSIS — E78.2 MIXED HYPERLIPIDEMIA: ICD-10-CM

## 2025-02-28 LAB — SL AMB POCT HEMOGLOBIN AIC: 5.3 (ref ?–6.5)

## 2025-02-28 PROCEDURE — 83036 HEMOGLOBIN GLYCOSYLATED A1C: CPT | Performed by: STUDENT IN AN ORGANIZED HEALTH CARE EDUCATION/TRAINING PROGRAM

## 2025-02-28 PROCEDURE — 99214 OFFICE O/P EST MOD 30 MIN: CPT | Performed by: STUDENT IN AN ORGANIZED HEALTH CARE EDUCATION/TRAINING PROGRAM

## 2025-02-28 NOTE — PROGRESS NOTES
"Name: Margie Aguiar      : 1985      MRN: [id number]  Encounter Provider: Jessica Tello MD  Encounter Date: 2025   Encounter department: [facility]  :  Assessment & Plan  Prediabetes  -Resolved  -A1c down from 5.7 to 5.3  -Continue with diet and lifestyle modifications  Orders:  •  POCT hemoglobin A1c    Mixed hyperlipidemia  -Continue Crestor 10 mg daily  Orders:  •  Lipid Panel with Direct LDL reflex; Future    Abnormal mammogram of left breast  -Follow-up with diagnostic mammogram and diagnostic ultrasound of left breast              History of Present Illness   HPI    Patient presents for A1c check.  Notes that she has been monitoring her diet.  She has lost about 13 pounds since her last visit on 2024.  She notes she is feeling a lot better.    Review of Systems   Constitutional:  Negative for activity change, chills, diaphoresis, fatigue and fever.   HENT:  Negative for congestion, postnasal drip, rhinorrhea and sore throat.    Respiratory:  Negative for cough, shortness of breath and wheezing.    Cardiovascular:  Negative for chest pain, palpitations and leg swelling.   Gastrointestinal:  Negative for abdominal pain, constipation, diarrhea, nausea and vomiting.   Musculoskeletal:  Negative for myalgias.   Skin:  Negative for rash.   Neurological:  Negative for weakness, light-headedness and headaches.   Psychiatric/Behavioral:  The patient is not nervous/anxious.        Objective   /80 (BP Location: Left arm, Patient Position: Sitting, Cuff Size: Standard)   Pulse 76   Temp 97.7 °F (36.5 °C) (Temporal)   Resp 16   Ht 5' 2.5\" (1.588 m)   Wt 71.7 kg (158 lb)   LMP 2025 (Approximate)   SpO2 98%   BMI 28.44 kg/m²      Physical Exam  Constitutional:       Appearance: Normal appearance.   HENT:      Head: Normocephalic and atraumatic.   Cardiovascular:      Rate and Rhythm: Normal rate and regular rhythm.      Pulses: Normal pulses.     "  Heart sounds: Normal heart sounds.   Pulmonary:      Effort: Pulmonary effort is normal.      Breath sounds: Normal breath sounds.   Neurological:      General: No focal deficit present.      Mental Status: She is alert and oriented to person, place, and time.   Psychiatric:         Mood and Affect: Mood normal.         Behavior: Behavior normal.         Thought Content: Thought content normal.         Judgment: Judgment normal.

## 2025-03-19 ENCOUNTER — HOSPITAL ENCOUNTER (OUTPATIENT)
Dept: RADIOLOGY | Facility: HOSPITAL | Age: 40
Discharge: HOME | End: 2025-03-19
Payer: COMMERCIAL

## 2025-03-19 DIAGNOSIS — R92.8 ABNORMAL SCREENING MAMMOGRAM: ICD-10-CM

## 2025-03-19 PROCEDURE — 76642 ULTRASOUND BREAST LIMITED: CPT

## 2025-03-19 NOTE — PROGRESS NOTES
Met with patient Margie and radiologist Dr. Black in RN office regarding recommendation for:     _____ Right ___x___ Left      __x__ Ultrasound guided breast biopsy    _____ Stereotactic breast biopsy.      __x___Verbalized understanding.      Blood thinners:  _____ yes ___x___ no    Date stopped: (not applicable)    Biopsy teaching sheet given:  __x___ yes _____ no    Tentative biopsy appointment: Monday 04/07/2025 @ 1pm [facility] (check-in time @ 12:45pm)    Our breast center is located at [address] [place] [address]. The breast center is located on the first floor/ main entry level of [facility]. Please check in @ the main entrance/ main lobby of the hospital for your outpatient procedure.    Patient is aware she does not need to fast for a breast biopsy procedure, no special soap/ surgical scrubs need to be used the night before or morning of her procedure, and no changes need to be made to her normal medication schedule. Wear a comfortable two piece outfit to your appointment, and wear/ or bring a bra with you to your appointment as this will help hold the ice pack in place that we would like you to use post-procedure.

## 2025-03-20 ENCOUNTER — RESULTS FOLLOW-UP (OUTPATIENT)
Dept: OBGYN CLINIC | Facility: CLINIC | Age: 40
End: 2025-03-20

## 2025-03-20 NOTE — TELEPHONE ENCOUNTER
Spoke with patient regarding L breast imaging - probable fibroadenoma.  Has biopsy scheduled for April.  Call with further questions.

## 2025-04-07 ENCOUNTER — HOSPITAL ENCOUNTER (OUTPATIENT)
Dept: RADIOLOGY | Facility: HOSPITAL | Age: 40
Discharge: HOME | End: 2025-04-07
Payer: COMMERCIAL

## 2025-04-07 VITALS — DIASTOLIC BLOOD PRESSURE: 76 MMHG | HEART RATE: 74 BPM | SYSTOLIC BLOOD PRESSURE: 105 MMHG

## 2025-04-07 DIAGNOSIS — R92.8 ABNORMAL MAMMOGRAM OF LEFT BREAST: ICD-10-CM

## 2025-04-07 PROCEDURE — 88305 TISSUE EXAM BY PATHOLOGIST: CPT | Performed by: PATHOLOGY

## 2025-04-07 PROCEDURE — A4648 IMPLANTABLE TISSUE MARKER: HCPCS

## 2025-04-07 PROCEDURE — 88342 IMHCHEM/IMCYTCHM 1ST ANTB: CPT | Performed by: PATHOLOGY

## 2025-04-07 PROCEDURE — 19083 BX BREAST 1ST LESION US IMAG: CPT

## 2025-04-07 PROCEDURE — 88341 IMHCHEM/IMCYTCHM EA ADD ANTB: CPT | Performed by: PATHOLOGY

## 2025-04-07 RX ORDER — LIDOCAINE HYDROCHLORIDE 10 MG/ML
5 INJECTION, SOLUTION EPIDURAL; INFILTRATION; INTRACAUDAL; PERINEURAL ONCE
Status: COMPLETED | OUTPATIENT
Start: 2025-04-07 | End: 2025-04-07

## 2025-04-07 RX ADMIN — LIDOCAINE HYDROCHLORIDE 5 ML: 10 INJECTION, SOLUTION EPIDURAL; INFILTRATION; INTRACAUDAL; PERINEURAL at 13:21

## 2025-04-07 NOTE — PROGRESS NOTES
Procedure type:    ____x_ultrasound guided _____stereotactic    Breast:    __x___Left _____Right    Location: 8:00 3 cm    Needle: 14g    # of passes: 4    Clip: top hat     Performed by: Dr Black    Pressure held for 5 minutes by: Norma Perez Strips:    ___x__yes _____no    Band aid:    _____yes___x__no    Tape and guaze:    ___x__yes _____no    Tolerated procedure:    ___x__yes _____no

## 2025-04-07 NOTE — DISCHARGE INSTR - OTHER ORDERS
POST LARGE CORE BREAST BIOPSY PROCEDURE: PATIENT INFORMATION      Place an ice pack inside your bra over the top of the gauze dressing every hour for 20 minutes (20 minutes on, 60 minutes off). Do this until bedtime. The ice pack should be used whether pain is or is not present, as it significantly reduces the chance for bruising, bleeding, or hematoma development at home after your procedure. Please use as instructed.    Do not shower or bathe until the following morning Tuesday 04/8/25 @ 2pm.    You may shower/bathe your breast carefully with the steri-strips in place.  Be careful not to loosen them.  The steri-strips should remain in place 3-5 days to allow adequate time for your body to heal the breast biopsy incision site. If steri-strips have not fallen off on their own by Friday evening 04/11/25, it would be appropriate to remove them.    You may have mild discomfort, and you may have some bruising where the needle entered the skin. Following a breast biopsy, it can be very common to have bruising around the biopsy site & in some cases the underside of the breast due to positioning during the procedure. This should clear within 1-2 weeks time post-procedure.    If you need medicine for discomfort, take acetaminophen products such as Tylenol. You may also take Advil or Motrin products. Avoid using any aspirin based products, as these medications can increase the risk for bleeding/ hematoma development at breast biopsy site.    Do not participate in strenuous activities such as-tennis, aerobics, skiing, weight lifting > 10 lbs, etc. for 24 hours. Refrain from swimming/soaking until biopsy incision is fully healed to reduce any risk for infection.    Wearing a bra for sleeping may be more comfortable for the first 24-48 hours.    Watch for continued bleeding, pain or fever over 101. If any of these symptoms occur, please contact our breast nurse navigator at the location where your biopsy was  performed.    During normal business hours (7:30 am-4:00 pm) please call the nurse navigator at the site where your   procedure was performed:    [facility] [place]/ [place]:  [phone], [phone] or [phone]  [facility]:  Marie Myers Radiology RN P# [phone]         or      Norma RODRIGUEZ P# [phone]              After 4 PM - please call your physician or go to the nearest Emergency Department location.          9.         The final results of your biopsy are usually available within one week.

## 2025-04-08 ENCOUNTER — TELEPHONE (OUTPATIENT)
Dept: RADIOLOGY | Facility: HOSPITAL | Age: 40
End: 2025-04-08

## 2025-04-09 PROCEDURE — 88342 IMHCHEM/IMCYTCHM 1ST ANTB: CPT | Performed by: PATHOLOGY

## 2025-04-09 PROCEDURE — 88305 TISSUE EXAM BY PATHOLOGIST: CPT | Performed by: PATHOLOGY

## 2025-04-09 PROCEDURE — 88341 IMHCHEM/IMCYTCHM EA ADD ANTB: CPT | Performed by: PATHOLOGY

## 2025-04-10 ENCOUNTER — TELEPHONE (OUTPATIENT)
Dept: RADIOLOGY | Facility: HOSPITAL | Age: 40
End: 2025-04-10

## 2025-04-10 DIAGNOSIS — N64.89: Primary | ICD-10-CM

## 2025-04-10 NOTE — PROGRESS NOTES
Toribio Vela,    Here is a completed list of our practicing breast surgeons, and surgical oncologists in the [place]/ [place]/ [place]/ [facility] area, so that you may review. We have multiple specialists throughout our [facility] network & I just wanted to forward a written copy of the surgeons names, office information, and campuses at which their services are available, as it may be easier to have on hand in text to reflect our discussion over the phone earlier this afternoon.      Dr Sean Felix- [facility] and [facility]   Offices: [address] [place] [address]    [address], [place], [place], [address], P# [phone]     *Surgical coordination, Medical Oncology consultation, & Infusion treatments would be coordinated through [facility] (if indicated). Radiation Oncology through [facility] or [facility] based on patient or physician preference (if treatment indicated).  Office P# [phone]    Dr Zoran Carey- General Surgery & Surgical Oncology- [facility], [place], and [place] Offices  Offices: [address] [place] [address]      [address] [place] [address] P# [phone]     [address] [place] [address]      *Surgical coordination, Infusion treatments, Medical Oncology consultation at [facility] (if treatment indicated), & Radiation Oncology through [place] or [facility] based on patient or physician preference (if treatment indicated).       Dr Vesta Lewis- [facility] & [facility]-   Offices:[address] [place] [address]     [address] [place] [address]    *Surgical coordination can be scheduled at [place] or [facility]. Infusion treatments, Medical Oncology consultation & Radiation Oncology through [facility] or [facility] based on  "patient or physician preference (if treatment indicated).   Office P# [phone]    Dr Cassandra Cardarelli- [facility] and [facility]   Offices:[address] [place] [address]    [address] [place] [address]    *Surgical coordination can be scheduled at [place] or [facility]. Infusion treatments, Medical Oncology consultation & Radiation Oncology through [facility] or [facility] based on patient or physician preference (if treatment indicated).   Office P# [phone]    Dr May Gabriel- [facility]/ [facility] & [facility]-    Offices: [address] [place] [address]     [address] [place], [address]    *Surgical coordination can be scheduled at [facility] or [facility]. Infusion treatments, Medical Oncology consultation & Radiation Oncology through [facility], [facility], or location of patient or physician preference (if treatment indicated).     Office P# [phone]    More information can be viewed through the [facility] website:  [email or web address]   On the website you can hover over the \"Quick Links\"  tab, and then drop down to the section titled \"Physician Directory *Find a Doc*\".      The surgical scheduling team should be contacting you within 1 week to schedule the appointment for your in-person consultation.    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 Surgical Consult is recommend so that the specialist can provide you additional information to make an informed decision on best management for this type of Benign growth. Typical conservative management would include serial imaging on a 6 month or yearly basis to observe for growth or any changes, versus the alternative option for elective excision.    It was a pleasure meeting & speaking with you! Please reach out to me if I can help with anything further.       -Jacinda Myers RN, " BSN  Radiology and Breast Care Services  [facility]  P# [phone]

## 2025-04-11 ENCOUNTER — RESULTS FOLLOW-UP (OUTPATIENT)
Dept: OBGYN CLINIC | Facility: CLINIC | Age: 40
End: 2025-04-11

## 2025-04-11 ENCOUNTER — TELEPHONE (OUTPATIENT)
Dept: HEMATOLOGY ONCOLOGY | Facility: CLINIC | Age: 40
End: 2025-04-11

## 2025-04-11 NOTE — TELEPHONE ENCOUNTER
Referral to Surgical Oncology received.  Chart reviewed by  for Surgical oncology at this time.       Diagnosis:   N64.89 (ICD-10-CM) - Fibroepithelial lesion of left breast     After review of chart, instructions for scheduling added to referral and sent to be scheduled as advised.

## 2025-04-24 ENCOUNTER — RESULTS FOLLOW-UP (OUTPATIENT)
Dept: OBGYN CLINIC | Facility: CLINIC | Age: 40
End: 2025-04-24

## 2025-04-24 NOTE — TELEPHONE ENCOUNTER
Spoke with patient regarding L breast biopsy - benign fibroepithelial lesion.  Has f/u with breast specialist to discuss possible removal on 5/28.  Call with further questions.

## 2025-04-29 ENCOUNTER — TELEPHONE (OUTPATIENT)
Age: 40
End: 2025-04-29

## 2025-04-29 ENCOUNTER — TELEPHONE (OUTPATIENT)
Dept: SURGICAL ONCOLOGY | Facility: CLINIC | Age: 40
End: 2025-04-29

## 2025-04-29 NOTE — TELEPHONE ENCOUNTER
Called the patient to offer her a sooner consult with Dr. Cardarelli tomorrow due to a cancellation but there was no answer. A message was left with the main office number provided.    When the patient returns the call, she should be offered an appointment with Dr. Cardarelli on 4/30 at 2:30.

## 2025-04-29 NOTE — TELEPHONE ENCOUNTER
Patient returning call to office regarding an earlier appt Dr. Cardarelli on 4/30 at 2:30. She is unable to make the appt.  She will keep her original appt for 5/28/25 @ 10:00am at [facility].

## 2025-05-27 PROBLEM — N64.89: Status: ACTIVE | Noted: 2025-05-27

## 2025-05-28 ENCOUNTER — OFFICE VISIT (OUTPATIENT)
Dept: SURGICAL ONCOLOGY | Facility: CLINIC | Age: 40
End: 2025-05-28
Attending: PHYSICIAN ASSISTANT
Payer: COMMERCIAL

## 2025-05-28 VITALS
OXYGEN SATURATION: 98 % | HEART RATE: 100 BPM | DIASTOLIC BLOOD PRESSURE: 76 MMHG | TEMPERATURE: 98.7 F | RESPIRATION RATE: 16 BRPM | WEIGHT: 160 LBS | SYSTOLIC BLOOD PRESSURE: 126 MMHG | HEIGHT: 63 IN | BODY MASS INDEX: 28.35 KG/M2

## 2025-05-28 DIAGNOSIS — R92.323 MAMMOGRAPHIC FIBROGLANDULAR DENSITY, BILATERAL BREASTS: ICD-10-CM

## 2025-05-28 DIAGNOSIS — Z12.39 BREAST CANCER SCREENING OTHER THAN MAMMOGRAM: ICD-10-CM

## 2025-05-28 DIAGNOSIS — N64.89: Primary | ICD-10-CM

## 2025-05-28 PROCEDURE — 99244 OFF/OP CNSLTJ NEW/EST MOD 40: CPT | Performed by: STUDENT IN AN ORGANIZED HEALTH CARE EDUCATION/TRAINING PROGRAM

## 2025-05-28 NOTE — ASSESSMENT & PLAN NOTE
39-year-old female with biopsy-proven fibroepithelial lesion of the left breast.  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  I did explain to patient that we cannot definitively distinguish phyllodes tumors from fibroepithelial lesion without full excision.  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  Per patient mass has not changed in size over approximately 5 years.  Patient has elected to undergo short interval imaging as opposed to surgical resection.  A left breast ultrasound and diagnostic mammogram are ordered for October 2025.  Will follow-up with patient for clinical breast exam following this imaging.  Patient was made aware that should she have any changes in the appearance of her breast such as nipple retraction, enlargement of the mass, or any other breast concerns that she should return sooner.  During this discussion I did explain to patient that a Tracy localizer will need to be placed prior to resection should she choose to undergo surgery.  Patient indicated the understanding of the above has elected to undergo short and will imaging.  Orders:  •  US breast left limited (diagnostic); Future  •  Mammo diagnostic left w 3d and cad; Future

## 2025-05-28 NOTE — PROGRESS NOTES
Name: Margie Aguiar      : 1985      MRN: [id number]  Encounter Provider: Cassandra Lynn Cardarelli, MD  Encounter Date: 2025   Encounter department: [facility] SURGICAL ONCOLOGY [facility]  :  Assessment & Plan  Fibroepithelial lesion of left breast  39-year-old female with biopsy-proven fibroepithelial lesion of the left breast.  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  I did explain to patient that we cannot definitively distinguish phyllodes tumors from fibroepithelial lesion without full excision.  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  Per patient mass has not changed in size over approximately 5 years.  Patient has elected to undergo short interval imaging as opposed to surgical resection.  A left breast ultrasound and diagnostic mammogram are ordered for 2025.  Will follow-up with patient for clinical breast exam following this imaging.  Patient was made aware that should she have any changes in the appearance of her breast such as nipple retraction, enlargement of the mass, or any other breast concerns that she should return sooner.  During this discussion I did explain to patient that a Tracy localizer will need to be placed prior to resection should she choose to undergo surgery.  Patient indicated the understanding of the above has elected to undergo short and will imaging.  Orders:  •  US breast left limited (diagnostic); Future  •  Mammo diagnostic left w 3d and cad; Future    Mammographic fibroglandular density, bilateral breasts  Patient with scattered areas of fibroglandular density on her screening mammogram.  I reviewed breast density with patient.  No strong indication for additional screening imaging.       Breast cancer screening other than mammogram  Patient's Tyrer-Cuzick score was calculated in clinic today.  Her lifetime risk is approximately 7.7%.  I reviewed with patient that this is below average which is  "12.5%.    We also briefly discussed modifiable risks for breast cancer.  These are the recommended mentations per the [facility] disease [facility]:    Not being physically active.   Being overweight or having obesity after menopause.   Taking hormones. \"Some forms of hormone replacement therapy (those that include both estrogen and progesterone) taken during menopause can raise risk for breast cancer when taken for more than 5 years. Certain oral contraceptives (birth control pills) also have been found to raise breast cancer risk.\"  Reproductive history Having the first pregnancy after age 29, not breastfeeding, and never having a full-term pregnancy can raise breast cancer risk.  Drinking alcohol                  History of Present Illness   Margei Aguiar is a 39 y.o. year old female who presents following abnormal screening mammogram.  There is an oval mass that was well-circumscribed noted in December 2024.  This was followed up by diagnostic imaging and a core needle biopsy.  She noted this mass in her breast approximately 5 years ago and has remained unchanged.  She denies any pain, other changes in her breast, nipple discharge, or changes in the appearance of her breast.  This was her first screening mammogram.  Patient has no prior surgeries of the breast.  Her health is otherwise been well.     Denies unintentional weight loss, night sweats, bone pain, new onset headaches, or abdominal pain.       Review of Systems A complete review of systems is negative other than that noted above in the HPI.    Past Medical History   Past Medical History[1]  Past Surgical History[2]  Family History[3]   reports that she has never smoked. She has never been exposed to tobacco smoke. She has never used smokeless tobacco. She reports current alcohol use. She reports that she does not use drugs.  Current Outpatient Medications   Medication Instructions   • cetirizine (ZYRTEC) 10 mg, As needed   • rosuvastatin (CRESTOR) " "10 mg, Oral, Daily   Allergies[4]        Objective   /76 (Patient Position: Sitting, Cuff Size: Standard)   Pulse 100   Temp 98.7 °F (37.1 °C) (Temporal)   Resp 16   Ht 5' 2.5\" (1.588 m)   Wt 72.6 kg (160 lb)   SpO2 98%   BMI 28.80 kg/m²     Pain Screening:     ECOG    Physical Exam  Vitals reviewed.   Constitutional:       Appearance: Normal appearance.   HENT:      Head: Normocephalic and atraumatic.      Mouth/Throat:      Mouth: Mucous membranes are moist.     Cardiovascular:      Rate and Rhythm: Normal rate and regular rhythm.      Pulses: Normal pulses.      Heart sounds: Normal heart sounds.   Pulmonary:      Effort: Pulmonary effort is normal.      Breath sounds: Normal breath sounds.   Chest:   Breasts:     Right: Normal.      Left: Mass present.        Comments: The left breast was examined in the sitting and supine position.  There are no worrisome skin changes, tenderness, inverted nipple, nipple discharge, swelling, or bleeding in any quadrant.  There is a small subcentimeter mobile palpable mass in the medial lower quadrant of the left breast.  Susana survey demonstrated no evidence of any clinically suspicious axillary, pectoral or paraclavicular lymph nodes    The right breast was examined in the sitting and supine position.  There are no worrisome skin changes, tenderness, inverted nipple, nipple discharge, swelling, bleeding or evidence of a mass in any quadrant.  Susana survey demonstrated no evidence of any clinically suspicious axillary, pectoral or paraclavicular lymph nodes  Abdominal:      General: Abdomen is flat.      Palpations: Abdomen is soft.     Musculoskeletal:      Right lower leg: No edema.      Left lower leg: No edema.   Lymphadenopathy:      Upper Body:      Right upper body: No supraclavicular, axillary or pectoral adenopathy.      Left upper body: No supraclavicular, axillary or pectoral adenopathy.     Skin:     General: Skin is warm.     Neurological:      " "General: No focal deficit present.      Mental Status: She is alert and oriented to person, place, and time.     Psychiatric:         Mood and Affect: Mood normal.         Behavior: Behavior normal.         Thought Content: Thought content normal.          Labs: I have reviewed pertinent labs.   No visits with results within 1 Month(s) from this visit.   Latest known visit with results is:   Hospital Outpatient Visit on 04/07/2025   Component Date Value Ref Range Status   • Case Report 04/07/2025    Final                    Value:Surgical Pathology Report                         Case: [id number]                                  Authorizing Provider:  Heena Cloud PA-C  Collected:           04/07/2025 1332              Ordering Location:     [facility] Received:            04/07/2025 1353                                     Mammography                                                                  Pathologist:           Santana Murphy MD                                                           Specimen:    Breast, Left, US GUIDED LEFT BREAST BX 8:00 3 CM FN, 4 PASSES 14 G MARQUEE                • Final Diagnosis 04/07/2025    Final                    Value:A. Breast, \"Ultrasound-guided left breast biopsy 800, 3 cm from nipple 4 passes 14-gauge marquee,\" Biopsy:  - Fibroepithelial lesion, favor fibroadenoma (9 mm)  - Negative for hyperplasia or carcinoma     • Note 04/07/2025    Final                    Value:Immunohistochemistry was performed on blocks A1 and A2 with adequate controls. SMMHC, p63, and CK5/6 show preservation of myoepithelial cells and basal cells. AE1/AE3 is positive in epithelial elements.          • Additional Information 04/07/2025    Final                    Value:All reported additional testing was performed with appropriately reactive controls.  These tests were developed and their performance characteristics determined by [facility] or " "appropriate performing facility, though some tests may be performed on tissues which have not been validated for performance characteristics (such as staining performed on alcohol exposed cell blocks and decalcified tissues).  Results should be interpreted with caution and in the context of the patients’ clinical condition. These tests may not be cleared or approved by the U.S. Food and Drug Administration, though the FDA has determined that such clearance or approval is not necessary. These tests are used for clinical purposes and they should not be regarded as investigational or for research. This laboratory has been approved by CLIA 88, designated as a high-complexity laboratory and is qualified to perform these tests.  .Interpretation performed at [facility], [address] [facility] [address]       • Gross Description 04/07/2025    Final                    Value:A. The specimen is received in formalin, labeled with the patient's name and hospital number, and is designated \" ultrasound-guided left breast biopsy 800, 3 cm from nipple 4 passes 14-gauge marquee\".  The specimen consists of 4 tan friable tissue cores measuring less than 0.1-0.3 cm in diameter and ranging from 0.8-1.5 cm in length.  The specimen is entirely submitted between sponges, 2 cassettes.    Note: The estimated total formalin fixation time based upon information provided by the submitting clinician and the standard processing schedule is 10 hours.  The cold ischemia time based upon information provided by the submitting clinician and receiving staff in the laboratory is within 1 minute.       -Marina Holguin     • Clinical Information 04/07/2025    Final                    Value:FINDINGS:   LEFT  1) MASS [B]: Follow-up evaluation was performed for a mass in the lower inner left breast originally seen on screening mammogram dated D December 12, 2024.  On the present examination, there is an oval hypoechoic mass " with internal echogenic striations and scattered areas of internal vascularity measuring 13 x 7 x 12 mm.  Imaging features are suggestive of a fibroadenoma however patient requests tissue sampling.  Targeted ultrasound the left axillary tissue was unremarkable.          IMPRESSION:   Left breast mass at the 8:00 axis corresponding to mass seen on recent mammogram.  While imaging features are suggestive of a fibroadenoma, given patient preference, recommend ultrasound-guided biopsy for definitive diagnosis.       Pathology: I have reviewed pathology reports described above.    Radiology Results Review: I personally reviewed the following image studies in PACS and associated radiology reports: Mammogram and Breast Ultrasound. My interpretation of the radiology images/reports is: There is a Top- in place in the medial inferior aspect of the left breast.  Concordance: yes    Administrative Statements   I have spent a total time of 45 minutes in caring for this patient on the day of the visit/encounter including Diagnostic results, Prognosis, Risks and benefits of tx options, Instructions for management, Patient and family education, Importance of tx compliance, Risk factor reductions, Impressions, Counseling / Coordination of care, Documenting in the medical record, Reviewing/placing orders in the medical record (including tests, medications, and/or procedures), and Obtaining or reviewing history  .         [1]  Past Medical History:  Diagnosis Date   • Disease of thyroid gland    [2]  Past Surgical History:  Procedure Laterality Date   • BREAST BIOPSY Left 04/07/2025   • US GUIDED BREAST BIOPSY LEFT COMPLETE Left 4/7/2025   [3]  Family History  Problem Relation Name Age of Onset   • Cancer Maternal Aunt  55        Unknown primary   • Cancer Paternal Uncle  60        Oral   [4]  Allergies  Allergen Reactions   • Pollen Extract Itching